# Patient Record
(demographics unavailable — no encounter records)

---

## 2017-06-30 NOTE — EMERGENCY ROOM REPORT
History of Present Illness


General


Chief Complaint:  Multiple Trauma/Fall


Source:  Patient





Present Illness


HPI


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  She was sitting 

in a chair and slipped off and landed on her left thigh buttock area.  This 

occurred around 8:45 PM.  She complains of mild pain in that area.  Because it 

happened at work she was told to come here.  Denies any other complaint.  Able 

to walk to work since then.  No in time is about urine.  No fever or chills.  

No other injury.  Pain is 5/10.


Allergies:  


Coded Allergies:  


     NO KNOWN ALLERGIES (Unverified  Allergy, Unknown, 1/12/16)





Patient History


Past Medical History:  none, see triage record, old chart reviewed


Past Surgical History:  other


Pertinent Family History:  none


Social History:  Denies: smoking


Pregnant Now:  No


Immunizations:  other


Reviewed Nursing Documentation:  PMH: Agreed, PSxH: Agreed





Review of Systems


Eye:  Denies: blurred vision, eye pain


ENT:  Denies: ear pain, nose congestion, throat swelling


Respiratory:  Denies: cough, shortness of breath


Cardiovascular:  Denies: chest pain, palpitations


Gastrointestinal:  Denies: abdominal pain, diarrhea, nausea, vomiting


Musculoskeletal:  Reports: muscle pain, Denies: back pain, joint pain


Skin:  Denies: rash


Neurological:  Denies: headache, numbness


Endocrine:  Denies: increased thirst, increased urine


Hematologic/Lymphatic:  Denies: easy bruising


All Other Systems:  negative except mentioned in HPI





Physical Exam





Vital Signs








  Date Time  Temp Pulse Resp B/P Pulse Ox O2 Delivery O2 Flow Rate FiO2


 


6/30/17 04:01 97.9 82 16 167/78 97 Room Air  





vitals with hypertension


Sp02 EP Interpretation:  reviewed, normal


General Appearance:  well appearing, no apparent distress, alert


Head:  normocephalic, atraumatic


Eyes:  bilateral eye EOMI, bilateral eye PERRL


ENT:  hearing grossly normal, normal pharynx


Neck:  full range of motion, supple, no meningismus


Respiratory:  chest non-tender, lungs clear, normal breath sounds


Cardiovascular #1:  regular rate, rhythm, no murmur


Gastrointestinal:  normal bowel sounds, non tender, no mass, no organomegaly, 

no bruit, non-distended


Musculoskeletal:  back normal, gait/station normal, normal range of motion, 

other - Mild tenderness to the left proximal lateral thigh area.  No bony 

tenderness.  Full range of motion.


Neurologic:  alert, oriented x3


Psychiatric:  mood/affect normal


Skin:  warm/dry





Medical Decision Making


Diagnostic Impression:  


 Primary Impression:  


 Contusion of left thigh, initial encounter


ER Course


Patient with thigh contusion.  It is soft tissue.  No fracture dislocation.  

She is walking without any difficulty.  We'll discharge home.





Last Vital Signs








  Date Time  Temp Pulse Resp B/P Pulse Ox O2 Delivery O2 Flow Rate FiO2


 


6/30/17 04:01 97.9 82 16 167/78 97 Room Air  








Status:  improved


Disposition:  HOME, SELF-CARE


Condition:  Stable


Scripts


Ibuprofen* (MOTRIN*) 600 Mg Tablet


600 MG ORAL THREE TIMES A DAY, #30 TAB 0 Refills


   Prov: DONTA HERNANDEZ M.D.         6/30/17





Additional Instructions:  


Followup with employee health in 7 days.  Return if symptom worsen.











DONTA HERNANDEZ M.D. Jun 30, 2017 04:23

## 2017-11-15 NOTE — DIAGNOSTIC IMAGING REPORT
Indication: PAIN



Technique: 2 views of the chest



Comparison: none.



Findings: Lungs and pleural spaces are clear. Heart size is normal. Bones 

are

unremarkable..



Impression: No acute process

## 2017-11-15 NOTE — DIAGNOSTIC IMAGING REPORT
Indication: PAIN



Technique: 3 views of the left shoulder



Comparison: None



Findings: No acute fractures or dislocations. Joint spaces are preserved.



Impression:  Negative
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
